# Patient Record
Sex: MALE | NOT HISPANIC OR LATINO
[De-identification: names, ages, dates, MRNs, and addresses within clinical notes are randomized per-mention and may not be internally consistent; named-entity substitution may affect disease eponyms.]

---

## 2019-10-02 ENCOUNTER — APPOINTMENT (OUTPATIENT)
Dept: DERMATOLOGY | Facility: CLINIC | Age: 84
End: 2019-10-02
Payer: MEDICARE

## 2019-10-02 ENCOUNTER — TRANSCRIPTION ENCOUNTER (OUTPATIENT)
Age: 84
End: 2019-10-02

## 2019-10-02 ENCOUNTER — RESULT REVIEW (OUTPATIENT)
Age: 84
End: 2019-10-02

## 2019-10-02 PROCEDURE — 99203 OFFICE O/P NEW LOW 30 MIN: CPT | Mod: 25

## 2019-10-02 PROCEDURE — 17000 DESTRUCT PREMALG LESION: CPT

## 2019-10-25 ENCOUNTER — APPOINTMENT (OUTPATIENT)
Dept: DERMATOLOGY | Facility: CLINIC | Age: 84
End: 2019-10-25
Payer: MEDICARE

## 2019-10-25 PROCEDURE — 99213 OFFICE O/P EST LOW 20 MIN: CPT | Mod: 25

## 2019-10-25 PROCEDURE — 17000 DESTRUCT PREMALG LESION: CPT

## 2021-02-22 PROBLEM — Z00.00 ENCOUNTER FOR PREVENTIVE HEALTH EXAMINATION: Status: ACTIVE | Noted: 2021-02-22

## 2021-03-03 ENCOUNTER — OUTPATIENT (OUTPATIENT)
Dept: OUTPATIENT SERVICES | Facility: HOSPITAL | Age: 86
LOS: 1 days | End: 2021-03-03

## 2021-03-03 ENCOUNTER — APPOINTMENT (OUTPATIENT)
Dept: NUCLEAR MEDICINE | Facility: CLINIC | Age: 86
End: 2021-03-03
Payer: MEDICARE

## 2021-03-03 DIAGNOSIS — C61 MALIGNANT NEOPLASM OF PROSTATE: ICD-10-CM

## 2021-03-03 PROCEDURE — 78815 PET IMAGE W/CT SKULL-THIGH: CPT | Mod: 26,PS

## 2021-03-22 ENCOUNTER — APPOINTMENT (OUTPATIENT)
Dept: DERMATOLOGY | Facility: CLINIC | Age: 86
End: 2021-03-22

## 2021-05-10 ENCOUNTER — APPOINTMENT (OUTPATIENT)
Dept: MRI IMAGING | Facility: CLINIC | Age: 86
End: 2021-05-10
Payer: MEDICARE

## 2021-05-10 ENCOUNTER — OUTPATIENT (OUTPATIENT)
Dept: OUTPATIENT SERVICES | Facility: HOSPITAL | Age: 86
LOS: 1 days | End: 2021-05-10
Payer: MEDICARE

## 2021-05-10 DIAGNOSIS — Z00.8 ENCOUNTER FOR OTHER GENERAL EXAMINATION: ICD-10-CM

## 2021-05-10 PROCEDURE — 72197 MRI PELVIS W/O & W/DYE: CPT

## 2021-05-10 PROCEDURE — 72197 MRI PELVIS W/O & W/DYE: CPT | Mod: 26,MH

## 2021-05-10 PROCEDURE — 76377 3D RENDER W/INTRP POSTPROCES: CPT

## 2021-05-10 PROCEDURE — A9585: CPT

## 2021-05-10 PROCEDURE — 76377 3D RENDER W/INTRP POSTPROCES: CPT | Mod: 26

## 2021-06-21 ENCOUNTER — APPOINTMENT (OUTPATIENT)
Dept: DERMATOLOGY | Facility: CLINIC | Age: 86
End: 2021-06-21
Payer: MEDICARE

## 2021-06-21 PROCEDURE — 99213 OFFICE O/P EST LOW 20 MIN: CPT | Mod: 25

## 2021-06-21 PROCEDURE — 17003 DESTRUCT PREMALG LES 2-14: CPT

## 2021-06-21 PROCEDURE — 17000 DESTRUCT PREMALG LESION: CPT

## 2021-06-21 PROCEDURE — 99072 ADDL SUPL MATRL&STAF TM PHE: CPT

## 2024-01-22 ENCOUNTER — INPATIENT (INPATIENT)
Facility: HOSPITAL | Age: 89
LOS: 2 days | Discharge: HOME | DRG: 640 | End: 2024-01-25
Attending: STUDENT IN AN ORGANIZED HEALTH CARE EDUCATION/TRAINING PROGRAM | Admitting: HOSPITALIST
Payer: MEDICARE

## 2024-01-22 VITALS
RESPIRATION RATE: 18 BRPM | OXYGEN SATURATION: 99 % | HEART RATE: 110 BPM | HEIGHT: 67 IN | DIASTOLIC BLOOD PRESSURE: 90 MMHG | TEMPERATURE: 98 F | SYSTOLIC BLOOD PRESSURE: 123 MMHG | WEIGHT: 145.06 LBS

## 2024-01-22 DIAGNOSIS — I63.9 CEREBRAL INFARCTION, UNSPECIFIED: ICD-10-CM

## 2024-01-22 LAB
ALBUMIN SERPL ELPH-MCNC: 3.9 G/DL — SIGNIFICANT CHANGE UP (ref 3.3–5.2)
ALP SERPL-CCNC: 76 U/L — SIGNIFICANT CHANGE UP (ref 40–120)
ALT FLD-CCNC: 11 U/L — SIGNIFICANT CHANGE UP
ANION GAP SERPL CALC-SCNC: 14 MMOL/L — SIGNIFICANT CHANGE UP (ref 5–17)
APPEARANCE UR: CLEAR — SIGNIFICANT CHANGE UP
APTT BLD: 32.8 SEC — SIGNIFICANT CHANGE UP (ref 24.5–35.6)
AST SERPL-CCNC: 14 U/L — SIGNIFICANT CHANGE UP
BACTERIA # UR AUTO: NEGATIVE /HPF — SIGNIFICANT CHANGE UP
BASOPHILS # BLD AUTO: 0.03 K/UL — SIGNIFICANT CHANGE UP (ref 0–0.2)
BASOPHILS NFR BLD AUTO: 0.2 % — SIGNIFICANT CHANGE UP (ref 0–2)
BILIRUB SERPL-MCNC: 0.8 MG/DL — SIGNIFICANT CHANGE UP (ref 0.4–2)
BILIRUB UR-MCNC: NEGATIVE — SIGNIFICANT CHANGE UP
BUN SERPL-MCNC: 45.1 MG/DL — HIGH (ref 8–20)
CALCIUM SERPL-MCNC: 10.3 MG/DL — SIGNIFICANT CHANGE UP (ref 8.4–10.5)
CAST: 1 /LPF — SIGNIFICANT CHANGE UP (ref 0–4)
CHLORIDE SERPL-SCNC: 109 MMOL/L — HIGH (ref 96–108)
CO2 SERPL-SCNC: 22 MMOL/L — SIGNIFICANT CHANGE UP (ref 22–29)
COLOR SPEC: YELLOW — SIGNIFICANT CHANGE UP
CREAT SERPL-MCNC: 0.45 MG/DL — LOW (ref 0.5–1.3)
DIFF PNL FLD: NEGATIVE — SIGNIFICANT CHANGE UP
EGFR: 99 ML/MIN/1.73M2 — SIGNIFICANT CHANGE UP
EOSINOPHIL # BLD AUTO: 0.01 K/UL — SIGNIFICANT CHANGE UP (ref 0–0.5)
EOSINOPHIL NFR BLD AUTO: 0.1 % — SIGNIFICANT CHANGE UP (ref 0–6)
GLUCOSE SERPL-MCNC: 144 MG/DL — HIGH (ref 70–99)
GLUCOSE UR QL: NEGATIVE MG/DL — SIGNIFICANT CHANGE UP
HCT VFR BLD CALC: 41.7 % — SIGNIFICANT CHANGE UP (ref 39–50)
HGB BLD-MCNC: 13.7 G/DL — SIGNIFICANT CHANGE UP (ref 13–17)
IMM GRANULOCYTES NFR BLD AUTO: 0.5 % — SIGNIFICANT CHANGE UP (ref 0–0.9)
INR BLD: 1.18 RATIO — SIGNIFICANT CHANGE UP (ref 0.85–1.18)
KETONES UR-MCNC: 15 MG/DL
LACTATE BLDV-MCNC: 2 MMOL/L — SIGNIFICANT CHANGE UP (ref 0.5–2)
LEUKOCYTE ESTERASE UR-ACNC: ABNORMAL
LYMPHOCYTES # BLD AUTO: 0.64 K/UL — LOW (ref 1–3.3)
LYMPHOCYTES # BLD AUTO: 3.9 % — LOW (ref 13–44)
MCHC RBC-ENTMCNC: 32.9 GM/DL — SIGNIFICANT CHANGE UP (ref 32–36)
MCHC RBC-ENTMCNC: 33.3 PG — SIGNIFICANT CHANGE UP (ref 27–34)
MCV RBC AUTO: 101.5 FL — HIGH (ref 80–100)
MONOCYTES # BLD AUTO: 0.63 K/UL — SIGNIFICANT CHANGE UP (ref 0–0.9)
MONOCYTES NFR BLD AUTO: 3.9 % — SIGNIFICANT CHANGE UP (ref 2–14)
NEUTROPHILS # BLD AUTO: 14.88 K/UL — HIGH (ref 1.8–7.4)
NEUTROPHILS NFR BLD AUTO: 91.4 % — HIGH (ref 43–77)
NITRITE UR-MCNC: NEGATIVE — SIGNIFICANT CHANGE UP
PH UR: 6 — SIGNIFICANT CHANGE UP (ref 5–8)
PLATELET # BLD AUTO: 393 K/UL — SIGNIFICANT CHANGE UP (ref 150–400)
POTASSIUM SERPL-MCNC: 3.5 MMOL/L — SIGNIFICANT CHANGE UP (ref 3.5–5.3)
POTASSIUM SERPL-SCNC: 3.5 MMOL/L — SIGNIFICANT CHANGE UP (ref 3.5–5.3)
PROT SERPL-MCNC: 7.7 G/DL — SIGNIFICANT CHANGE UP (ref 6.6–8.7)
PROT UR-MCNC: 30 MG/DL
PROTHROM AB SERPL-ACNC: 13 SEC — SIGNIFICANT CHANGE UP (ref 9.5–13)
RAPID RVP RESULT: SIGNIFICANT CHANGE UP
RBC # BLD: 4.11 M/UL — LOW (ref 4.2–5.8)
RBC # FLD: 12.7 % — SIGNIFICANT CHANGE UP (ref 10.3–14.5)
RBC CASTS # UR COMP ASSIST: 2 /HPF — SIGNIFICANT CHANGE UP (ref 0–4)
SARS-COV-2 RNA SPEC QL NAA+PROBE: SIGNIFICANT CHANGE UP
SODIUM SERPL-SCNC: 144 MMOL/L — SIGNIFICANT CHANGE UP (ref 135–145)
SP GR SPEC: 1.02 — SIGNIFICANT CHANGE UP (ref 1–1.03)
SQUAMOUS # UR AUTO: 2 /HPF — SIGNIFICANT CHANGE UP (ref 0–5)
TROPONIN T, HIGH SENSITIVITY RESULT: 18 NG/L — SIGNIFICANT CHANGE UP (ref 0–51)
UROBILINOGEN FLD QL: 1 MG/DL — SIGNIFICANT CHANGE UP (ref 0.2–1)
WBC # BLD: 16.27 K/UL — HIGH (ref 3.8–10.5)
WBC # FLD AUTO: 16.27 K/UL — HIGH (ref 3.8–10.5)
WBC UR QL: 2 /HPF — SIGNIFICANT CHANGE UP (ref 0–5)

## 2024-01-22 PROCEDURE — 93010 ELECTROCARDIOGRAM REPORT: CPT

## 2024-01-22 PROCEDURE — 99285 EMERGENCY DEPT VISIT HI MDM: CPT

## 2024-01-22 PROCEDURE — 99223 1ST HOSP IP/OBS HIGH 75: CPT

## 2024-01-22 PROCEDURE — 70450 CT HEAD/BRAIN W/O DYE: CPT | Mod: 26,MA

## 2024-01-22 PROCEDURE — 99497 ADVNCD CARE PLAN 30 MIN: CPT | Mod: 25

## 2024-01-22 PROCEDURE — 71045 X-RAY EXAM CHEST 1 VIEW: CPT | Mod: 26

## 2024-01-22 RX ORDER — ACETAMINOPHEN 500 MG
650 TABLET ORAL EVERY 6 HOURS
Refills: 0 | Status: DISCONTINUED | OUTPATIENT
Start: 2024-01-22 | End: 2024-01-25

## 2024-01-22 RX ORDER — CEFTRIAXONE 500 MG/1
1000 INJECTION, POWDER, FOR SOLUTION INTRAMUSCULAR; INTRAVENOUS ONCE
Refills: 0 | Status: COMPLETED | OUTPATIENT
Start: 2024-01-22 | End: 2024-01-22

## 2024-01-22 RX ORDER — ONDANSETRON 8 MG/1
4 TABLET, FILM COATED ORAL EVERY 8 HOURS
Refills: 0 | Status: DISCONTINUED | OUTPATIENT
Start: 2024-01-22 | End: 2024-01-25

## 2024-01-22 RX ORDER — SODIUM CHLORIDE 9 MG/ML
2000 INJECTION, SOLUTION INTRAVENOUS ONCE
Refills: 0 | Status: DISCONTINUED | OUTPATIENT
Start: 2024-01-22 | End: 2024-01-22

## 2024-01-22 RX ORDER — ENOXAPARIN SODIUM 100 MG/ML
40 INJECTION SUBCUTANEOUS EVERY 24 HOURS
Refills: 0 | Status: DISCONTINUED | OUTPATIENT
Start: 2024-01-22 | End: 2024-01-25

## 2024-01-22 RX ORDER — LANOLIN ALCOHOL/MO/W.PET/CERES
3 CREAM (GRAM) TOPICAL AT BEDTIME
Refills: 0 | Status: DISCONTINUED | OUTPATIENT
Start: 2024-01-22 | End: 2024-01-25

## 2024-01-22 RX ORDER — SODIUM CHLORIDE 9 MG/ML
2000 INJECTION INTRAMUSCULAR; INTRAVENOUS; SUBCUTANEOUS ONCE
Refills: 0 | Status: COMPLETED | OUTPATIENT
Start: 2024-01-22 | End: 2024-01-22

## 2024-01-22 RX ADMIN — SODIUM CHLORIDE 2000 MILLILITER(S): 9 INJECTION INTRAMUSCULAR; INTRAVENOUS; SUBCUTANEOUS at 21:15

## 2024-01-22 RX ADMIN — CEFTRIAXONE 1000 MILLIGRAM(S): 500 INJECTION, POWDER, FOR SOLUTION INTRAMUSCULAR; INTRAVENOUS at 22:45

## 2024-01-22 NOTE — ED ADULT NURSE NOTE - PATIENT IS UNABLE TO BE SCREENED DUE TO:
Telephone Encounter by Sharad Toledo at 02/09/17 10:33 AM     Author:  Sharad Toledo Service:  (none) Author Type:  (none)     Filed:  02/09/17 10:34 AM Encounter Date:  2/9/2017 Status:  Signed     :  Sharad Toledo            Left message on recorder to call back.[AO1.1T]     Please schedule complete with Dr. Matson for diabetic exam, per orders.[AO1.1M]       Revision History        User Key Date/Time User Provider Type Action    > AO1.1 02/09/17 10:34 AM Sharda Toledo (none) Sign    M - Manual, T - Template             Patient refused

## 2024-01-22 NOTE — H&P ADULT - CONVERSATION DETAILS
Discussed GOC with patient daughter Jing who states she is his HCP. She says they have had this discussion in the past, and her father does not want any extreme measures. In the event of cardiac arrest, he would not want CPR including chest compressions and intubation and would prefer to pass peacefully. They would want to trial antibiotics, IVF, and to send patient to the hospital if needed, but would not want to take any extreme measures. MOLST form completed indicating DNR/DNI with trial of NIPPV.

## 2024-01-22 NOTE — ED PROVIDER NOTE - NSICDXPASTMEDICALHX_GEN_ALL_CORE_FT
PAST MEDICAL HISTORY:  Chronic UTI     CVA (cerebrovascular accident)     Prostate CA     Right hemiplegia

## 2024-01-22 NOTE — ED PROVIDER NOTE - OBJECTIVE STATEMENT
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 Hx obtained from daughters at bedside. Patient is noted decrease oral intake for the past 1.5 weeks. and no oral intake over the past 24 hours. Patient also developed slurred speech yesteday afternoon which has progressed. 89 yo male with pmhx of htn, cva with complete right sided hemiplegia and dysphagia, prostate CA, and chronic UTI. Hx obtained from daughters at bedside. Patient is noted decrease oral intake for the past 1.5 weeks. and no oral intake over the past 24 hours. Patient also developed slurred speech yesterday afternoon which has progressed. Patient's daughters' state he has been on antibiotics for several weeks to treat UTI. The antibiotics was changed about two weeks ago. 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 Hx obtained from daughters at bedside. Patient is noted decrease oral intake for the past 1.5 weeks. and no oral intake over the past 24 hours. Patient also developed slurred speech yesterday afternoon which has progressed. Patient's daughters' state he has been on antibiotics for several weeks to treat UTI. The antibiotics was changed about two weeks ago; ciprofloxacin to macrobid.

## 2024-01-22 NOTE — ED ADULT TRIAGE NOTE - CHIEF COMPLAINT QUOTE
pt BIBA from home with AMS and slurred speech, LKW 2 days ago. pt with hx CVA in the past as well. pt confused, cannot follow commands.

## 2024-01-22 NOTE — H&P ADULT - ASSESSMENT
89 yo male with pmhx of HTN, CVA with residual right sided hemiplegia and dysphagia, Prostate CA, chronic UTI presenting to the ED with daughter for decrease PO intake and slurred speech.    Slurred Speech      SIRS+, no source  -Tachycardic 110 and leukocytosis of 16K  -UA neg, though patient has reportedly just been on Cipro then Macrobid outpatient for UTI  -CXR without infiltrate  -Cover with Ceftriaxone for now  -Follow up blood and urine cultures     HTN      Prostate Ca       DVTppx:  GOC: 89 yo male with pmhx of HTN, CVA with residual right sided hemiplegia and dysphagia, Prostate CA, chronic UTI presenting to the ED with daughter for decrease PO intake and slurred speech.    Slurred Speech, AMS  -Admit to medicine  -CTH negative for signs of acute infarct; chronic infarct as well as chronic changes/volume loss noted  -Patient recently started on Remeron, will hold for now in case it is contributing    SIRS+, no source  -Tachycardic 110 and leukocytosis of 16K  -UA neg, though patient has reportedly just been on Cipro then Macrobid outpatient for UTI  -CXR without infiltrate  -Cover with Ceftriaxone for now  -Follow up blood and urine cultures     HTN  -Continue Lisinopril 10 mg daily with hold parameters    Prostate Ca   -On Enzalumatide 140 mg daily  -Non-formulary, patient may bring home med    Overactive Bladder  -Continue Myrbetric ER 50 mg daily    DVTppx: Lovenox  GOC: DNR/DNI, MOLST completed in chart 89 yo male with pmhx of HTN, CVA with residual right sided hemiplegia and dysphagia, Prostate CA, chronic UTI presenting to the ED with daughter for decrease PO intake and slurred speech.    Slurred Speech, AMS  -Admit to medicine  -CTH negative for signs of acute infarct; chronic infarct as well as chronic changes/volume loss noted  -Check MRI to r/o stroke  -TTE   -Neurochecks Q4  -Patient recently started on Remeron, will hold for now in case it is contributing  -SLP eval; NPO pending eval  -PT/OT eval  -Check Vit B12, TSH, Ammonia, VBG  -NIH scale could not be performed as patient could not participate in exam; he is arousable but not following commands  -Neuro Stroke team consulted    SIRS+, no source  -Tachycardic 110 and leukocytosis of 16K  -UA neg, though patient has reportedly just been on Cipro then Macrobid outpatient for UTI  -CXR without infiltrate, no upper respiratory symptoms to report  -Cover with Ceftriaxone for now  -Follow up blood and urine cultures     HTN  -Continue Lisinopril 10 mg daily with hold parameters    Prostate Ca   -On Enzalumatide 140 mg daily  -Non-formulary, patient may bring home med    Overactive Bladder  -Continue Myrbetric ER 50 mg daily once patient can tolerate PO    DVTppx: Lovenox  GOC: DNR/DNI, MOLST completed in chart 89 yo male with pmhx of HTN, CVA with residual right sided hemiplegia and dysphagia, Prostate CA, chronic UTI presenting to the ED with daughter for decrease PO intake and slurred speech.    Slurred Speech, AMS  -Admit to medicine  -CTH negative for signs of acute infarct; chronic infarct as well as chronic changes/volume loss noted  -Check MRI to r/o stroke  -TTE   -Rectal aspirin, start statin when patient can tolerate PO  -Neurochecks Q4  -Patient recently started on Remeron, will hold for now in case it is contributing  -SLP eval; NPO pending eval  -PT/OT eval  -Check Vit B12, TSH, Ammonia, VBG  -NIH scale could not be performed as patient could not participate in exam; he is arousable but not following commands  -Neuro Stroke team consulted    SIRS+, no source  -Tachycardic 110 and leukocytosis of 16K  -UA neg, though patient has reportedly just been on Cipro then Macrobid outpatient for UTI  -CXR without infiltrate, no upper respiratory symptoms to report  -Cover with Ceftriaxone for now  -Follow up blood and urine cultures     HTN  -Continue Lisinopril 10 mg daily with hold parameters    Prostate Ca   -On Enzalumatide 140 mg daily  -Non-formulary, patient may bring home med    Overactive Bladder  -Continue Myrbetric ER 50 mg daily once patient can tolerate PO    DVTppx: Lovenox  GOC: DNR/DNI, MOLST completed in chart

## 2024-01-22 NOTE — ED ADULT NURSE NOTE - ED STAT RN HANDOFF DETAILS
Report given to YANIQUE Lagunas in CDU. Pt mental status at baseline, VSS, pt on CM. No distress noted, no complaints at this time.

## 2024-01-22 NOTE — H&P ADULT - DECISION MAKER
Jermanie Draper is a 67 year old male who presents to the office for follow up on HTN, hyperlipidemia, renal failure, and elevated blood sugar    The patient has been checking their blood pressure since the last office visit and has been getting readings of 130's - 140's/80's.   He is off all bp meds.  He has seen sbp in 200's once.     The patient has been working on their cholesterol diet. He feels it is doing well.     He is scheduled for TURP.  He has BPH with urinary obstruction which has caused elevated creatinine.      He has been working on his sugar diet. He feels it is doing well.     ROS:  The patient denies any fever/chills, n/v/d, lightheadness/dizziness, tinnitus, sore throat, cough, sob, wheezing, chest pain, abd pain, or rash.      Nurses notes reviewed and agreed with.    Past Medical History   Diagnosis Date   • Anxiety    • Arthritis      Hands   • COPD (chronic obstructive pulmonary disease)    • Depression    • Epistaxis    • Esophageal reflux    • Essential hypertension, benign    • Generalized anxiety disorder 6/24/2010   • Hiatal Hernia    • Hydronephrosis 10/18/2016   • Hypertrophy of prostate with urinary obstruction and other lower urinary tract symptoms (LUTS)    • Internal hemorrhoids    • Mixed hyperlipidemia    • Pneumonia    • PTSD (post-traumatic stress disorder)      War    • Sciatica    • Self-catheterizes urinary bladder      4x/day   • Sinusitis, chronic    • Wears glasses        Past Surgical History   Procedure Laterality Date   • Head surgery proc unlisted       Skull/Facial/TMJ   • Mri brain combo  4/15/05     Normal mri brain/Lg 3.3 cm mass rt.nasal cavity prob.benign.   • Ct sinus no contrast limt  6/29/05   • Mri lumbar spine combo  2/23/06     Findings are consistent with mild central stenosis and seveve rt.neural foramina stenosis.   • Colonoscopy w biopsy  09/20/2006     internal hemorrhids, granular mucosa in rectum. BX:suggestive of possible solitary ulcer  syndrome/mucosal prolapse syndrome. Focal hyperplastic change   • Esophagogastroduodenoscopy transoral flex w/bx single or mult  09/20/2006     Kristi Morton Grade I reflux esophagitis. Hiatal hernia. Gastritis. Erythematous duodenopathy. BX:Gastric mucosa shows reactive foveolar hyperplasia, fibrosis and chronic inflammation consistent with tissue adjacent to healed or healing ulcer. No H Pylori. Focal intestinal metaplasia nted. Red cell extravasation in benign squamous mucosa   • Knee surgery       bilateral   • Tear duct surgery     • Colonoscopy w biopsy  6/1/2012     Fam Hx CC, 2 tubular adenomas, 5 yr recall   • Eye surgery Left 05/03/2007     cataract   • Eye surgery Right 05/24/2007     cataract       ALLERGIES:  No Known Allergies    Current Outpatient Prescriptions   Medication Sig Dispense Refill   • magnesium lactate (MAG-TAB SR) 84 MG (7MEQ) Tab CR Take 1 tablet by mouth daily (with breakfast). 30 tablet 3   • ferrous sulfate 325 (65 FE) MG tablet Take 1 tablet by mouth daily (with breakfast). 30 tablet 3   • tamsulosin (FLOMAX) 0.4 MG Cap Take 1 capsule by mouth nightly. 90 capsule 3   • cholecalciferol (VITAMIN D3) 1000 UNITS tablet Take 1 tablet by mouth daily. 30 tablet 1   • buPROPion (WELLBUTRIN XL) 150 MG 24 hr tablet Take 1 tablet by mouth daily. 90 tablet 3   • lovastatin (MEVACOR) 40 MG tablet Take 2 tablets by mouth nightly. 180 tablet 3   • albuterol (PROAIR HFA) 108 (90 BASE) MCG/ACT inhaler Inhale 2 puffs into the lungs every 4 hours as needed for Shortness of Breath or Wheezing. 3 Inhaler 3   • Garlic 1000 MG CAPS Take 2 capsules by mouth daily. 60 capsule 0   • omeprazole 20 MG tablet Take 1 tablet by mouth daily.     • Multiple Vitamin (MULTIVITAMIN) capsule Take 1 capsule by mouth daily.     • Cinnamon 500 MG CAPS Take 2,000 mg by mouth daily.     • Omega-3 Fatty Acids (FISH OIL) 1200 MG capsule Take 1,200 mg by mouth daily.     • Turmeric 500 MG CAPS Take 500 mg by mouth daily.     •  amoxicillin (AMOXIL) 500 MG capsule Take 1 capsule by mouth 2 times daily for 10 days. 20 capsule 0   • amLODIPine (NORVASC) 5 MG tablet Take 1 tablet by mouth daily. 30 tablet 1     No current facility-administered medications for this visit.        Family History   Problem Relation Age of Onset   • Hypertension Mother    • Diabetes Mother    • Cancer Father      colon (60's)   • Diabetes Sister      1   • Diabetes Sister      2       Social History   Substance Use Topics   • Smoking status: Former Smoker     Packs/day: 2.50     Years: 40.00     Types: Cigarettes     Quit date: 4/14/2014   • Smokeless tobacco: Former User   • Alcohol use 0.6 oz/week     1 Standard drinks or equivalent per week     PE:  GEN - NAD, A&OX3, generally healthy appearance  HEART - RRR without M/R/C/G, PMI nondisplaced.  LUNGS - CTA B, good AE, no wz, no retractions.  ALL EXT - FROM, muscle strength 5/5 all ext and symmetric;       2/4 distal pulses all ext, no edema, good cap refill,       intact sensation, negative homans.  SKIN - no rashes/lesions    A -  1) HTN   2) Hyperlipidemia   3) Renal failure   4) BPH   5) Elevated blood sugar    P -  1) HTN - uncontrolled - Reviewed the lab results with the patient.  discussed treatment options. Discussed potential side effects of the medications/procedures with the patient. Reviewed dietary and exercise modifications with the patient.  They wish to work on diet and exercise and add Norvasc     2) Hyperlipidemia - uncontrolled - Reviewed the lab results with the patient.  discussed treatment options. Discussed potential side effects of the medications/procedures with the patient. Reviewed dietary and exercise modifications with the patient.  They wish to work on diet and exercise.      3) Renal failure - ? Secondary to BPH - will await results of surgery     4) BPH - uncontrolled - he is scheduled for surgery on 1/27/17     5) Elevated blood sugar - uncontrolled - Reviewed the lab results with  the patient.  discussed treatment options. Discussed potential side effects of the medications/procedures with the patient. Reviewed dietary and exercise modifications with the patient.  They wish to work on diet and exercise.     Patient to follow up for routine health care and prn.    Health Care Proxy

## 2024-01-22 NOTE — H&P ADULT - HISTORY OF PRESENT ILLNESS
89 yo male with pmhx of HTN, CVA with residual right sided hemiplegia and dysphagia, Prostate CA, chronic UTI presenting to the ED with daughter for decrease PO intake and slurred speech. 89 yo male with pmhx of HTN, CVA with residual right sided hemiplegia and dysphagia, Prostate CA, chronic UTI presenting to the ED with daughter for decrease PO intake and slurred speech. Called daughter Jing Vazquez [phone] (HCP) to obtain collateral info as patient currently cannot give history. Daughter states patient had a hemorrhagic stroke 1.5 years ago when residing in [place]. From this stroke, he has residual right sided weakness and dysphagia. He still can tolerate soft foods. Since then, he has moved to [place] to live with her and up until recently has been doing well. About 1.5 weeks ago, she noted him to have decreased appetite and poor PO intake. She then noted him to have slurred speech and confusion over the past day which has progressed. Daughter states patient is normally AAOx4, clear and very sharp. Patient did recently complete 2 courses of antibiotics outpatient for UTI (Cipro and Macrobid). Daughter denies fever at home, no cough, shortness of breath, abdominal pain reported.

## 2024-01-22 NOTE — ED ADULT NURSE NOTE - OBJECTIVE STATEMENT
Bibems, patient has pmhx htn, right sided hemiplegia, patient doesn't answer questions, refuses neuro checks. Denies chest pain. Able to move left arm. Patient awakens to verbal stimuli and responds to name.

## 2024-01-22 NOTE — ED PROVIDER NOTE - ENMT, MLM
Airway patent, Nasal mucosa clear. Mouth with tacky mucosa. Throat has no vesicles, no oropharyngeal exudates and uvula is midline.

## 2024-01-22 NOTE — ED ADULT NURSE REASSESSMENT NOTE - NS ED NURSE REASSESS COMMENT FT1
Gave report to YANIQUE Carlos, patient transported to critical 5 , patient is awake, calm, RR even and unlabored, sinus tachy on cm, iv patent, ct performed, bed locked in lowest position, safety maintained.
patient transported to ct, refusing neuro check, awakens to verbal stimuli, iv patent, blood work sent to lab, fs performed, sinus tachy on cm. RR even and unlabored.
Assumed care of pt at this time. Pt arrived from CT. Pt A&Ox2 oriented to person and place, awake and responsive to verbal stimuli. Pt arrives c/o slurred speech and AMS since yesterday. Family endorses pt is "not himself. Normally he is alert and able to communicate." Hx of stroke. At baseline pt has right sided paralysis and decreased sensation. Airway patent. Respirations even and unlabored. Bed in lowest position with side rails up. Pt on cardiac monitor NSR. Family at bedside.

## 2024-01-22 NOTE — ED PROVIDER NOTE - CLINICAL SUMMARY MEDICAL DECISION MAKING FREE TEXT BOX
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 Hx obtained from daughters at bedside. Patient is noted decrease oral intake for the past 1.5 weeks. and no oral intake over the past 24 hours. Patient also developed slurred speech yesterday afternoon which has progressed. Patient's daughters' state he has been on antibiotics for several weeks to treat UTI. The antibiotics was changed about two weeks ago. Will assess for cause of patient's change in condition; labs, CT, UA, and re-assess.

## 2024-01-22 NOTE — ED ADULT NURSE NOTE - NSFALLHARMRISKINTERV_ED_ALL_ED

## 2024-01-22 NOTE — H&P ADULT - NSHPLABSRESULTS_GEN_ALL_CORE
13.7   16.27 )-----------( 393      ( 2024 19:35 )             41.7     2024 19:35    144    |  109    |  45.1   ----------------------------<  144    3.5     |  22.0   |  0.45     Ca    10.3       2024 19:35    TPro  7.7    /  Alb  3.9    /  TBili  0.8    /  DBili  x      /  AST  14     /  ALT  11     /  AlkPhos  76     2024 19:35    PT/INR - ( 2024 19:35 )   PT: 13.0 sec;   INR: 1.18 ratio         PTT - ( 2024 19:35 )  PTT:32.8 sec  CAPILLARY BLOOD GLUCOSE      POCT Blood Glucose.: 139 mg/dL (2024 20:37)  POCT Blood Glucose.: 133 mg/dL (2024 19:50)    LIVER FUNCTIONS - ( 2024 19:35 )  Alb: 3.9 g/dL / Pro: 7.7 g/dL / ALK PHOS: 76 U/L / ALT: 11 U/L / AST: 14 U/L / GGT: x           Urinalysis Basic - ( 2024 21:20 )    Color: Yellow / Appearance: Clear / S.025 / pH: x  Gluc: x / Ketone: 15 mg/dL  / Bili: Negative / Urobili: 1.0 mg/dL   Blood: x / Protein: 30 mg/dL / Nitrite: Negative   Leuk Esterase: Trace / RBC: 2 /HPF / WBC 2 /HPF   Sq Epi: x / Non Sq Epi: 2 /HPF / Bacteria: Negative /HPF      < from: CT Head No Cont (24 @ 20:47) >    IMPRESSION:  No acute intracranial abnormality. Volume loss and chronic microvascular   ischemic disease as above. Old lacunar infarcts as above. Very mild sinus   disease.    --- End of Report ---    < end of copied text >

## 2024-01-22 NOTE — H&P ADULT - NSHPLANGLIMITEDENGLISH_GEN_A_CORE
Spoke with patient xiaoadrian  Deon Lopez explained he hasn't seen much change since starting the Tyvaso DPI- He is up to his target dose 64 4 times a day-
No

## 2024-01-23 DIAGNOSIS — Z98.890 OTHER SPECIFIED POSTPROCEDURAL STATES: Chronic | ICD-10-CM

## 2024-01-23 LAB
A1C WITH ESTIMATED AVERAGE GLUCOSE RESULT: 5 % — SIGNIFICANT CHANGE UP (ref 4–5.6)
AMMONIA BLD-MCNC: 26 UMOL/L — SIGNIFICANT CHANGE UP (ref 11–55)
ANION GAP SERPL CALC-SCNC: 13 MMOL/L — SIGNIFICANT CHANGE UP (ref 5–17)
BASE EXCESS BLDV CALC-SCNC: -1.8 MMOL/L — SIGNIFICANT CHANGE UP (ref -2–3)
BASOPHILS # BLD AUTO: 0.02 K/UL — SIGNIFICANT CHANGE UP (ref 0–0.2)
BASOPHILS NFR BLD AUTO: 0.3 % — SIGNIFICANT CHANGE UP (ref 0–2)
BUN SERPL-MCNC: 36 MG/DL — HIGH (ref 8–20)
CA-I SERPL-SCNC: 1.31 MMOL/L — SIGNIFICANT CHANGE UP (ref 1.15–1.33)
CALCIUM SERPL-MCNC: 9.5 MG/DL — SIGNIFICANT CHANGE UP (ref 8.4–10.5)
CHLORIDE BLDV-SCNC: 117 MMOL/L — HIGH (ref 96–108)
CHLORIDE SERPL-SCNC: 114 MMOL/L — HIGH (ref 96–108)
CHOLEST SERPL-MCNC: 175 MG/DL — SIGNIFICANT CHANGE UP
CO2 SERPL-SCNC: 19 MMOL/L — LOW (ref 22–29)
CREAT SERPL-MCNC: 0.41 MG/DL — LOW (ref 0.5–1.3)
EGFR: 102 ML/MIN/1.73M2 — SIGNIFICANT CHANGE UP
EOSINOPHIL # BLD AUTO: 0.04 K/UL — SIGNIFICANT CHANGE UP (ref 0–0.5)
EOSINOPHIL NFR BLD AUTO: 0.6 % — SIGNIFICANT CHANGE UP (ref 0–6)
ESTIMATED AVERAGE GLUCOSE: 97 MG/DL — SIGNIFICANT CHANGE UP (ref 68–114)
GAS PNL BLDV: 146 MMOL/L — HIGH (ref 136–145)
GAS PNL BLDV: SIGNIFICANT CHANGE UP
GLUCOSE BLDV-MCNC: 109 MG/DL — HIGH (ref 70–99)
GLUCOSE SERPL-MCNC: 109 MG/DL — HIGH (ref 70–99)
HCO3 BLDV-SCNC: 23 MMOL/L — SIGNIFICANT CHANGE UP (ref 22–29)
HCT VFR BLD CALC: 37.8 % — LOW (ref 39–50)
HCT VFR BLDA CALC: 38 % — SIGNIFICANT CHANGE UP
HDLC SERPL-MCNC: 38 MG/DL — LOW
HGB BLD CALC-MCNC: 12.6 G/DL — SIGNIFICANT CHANGE UP (ref 12.6–17.4)
HGB BLD-MCNC: 12.2 G/DL — LOW (ref 13–17)
IMM GRANULOCYTES NFR BLD AUTO: 0.7 % — SIGNIFICANT CHANGE UP (ref 0–0.9)
LACTATE BLDV-MCNC: 1 MMOL/L — SIGNIFICANT CHANGE UP (ref 0.5–2)
LIPID PNL WITH DIRECT LDL SERPL: 125 MG/DL — HIGH
LYMPHOCYTES # BLD AUTO: 1.07 K/UL — SIGNIFICANT CHANGE UP (ref 1–3.3)
LYMPHOCYTES # BLD AUTO: 15.1 % — SIGNIFICANT CHANGE UP (ref 13–44)
MCHC RBC-ENTMCNC: 32.3 GM/DL — SIGNIFICANT CHANGE UP (ref 32–36)
MCHC RBC-ENTMCNC: 33.9 PG — SIGNIFICANT CHANGE UP (ref 27–34)
MCV RBC AUTO: 105 FL — HIGH (ref 80–100)
MONOCYTES # BLD AUTO: 0.47 K/UL — SIGNIFICANT CHANGE UP (ref 0–0.9)
MONOCYTES NFR BLD AUTO: 6.6 % — SIGNIFICANT CHANGE UP (ref 2–14)
NEUTROPHILS # BLD AUTO: 5.42 K/UL — SIGNIFICANT CHANGE UP (ref 1.8–7.4)
NEUTROPHILS NFR BLD AUTO: 76.7 % — SIGNIFICANT CHANGE UP (ref 43–77)
NON HDL CHOLESTEROL: 137 MG/DL — HIGH
PCO2 BLDV: 36 MMHG — LOW (ref 42–55)
PH BLDV: 7.41 — SIGNIFICANT CHANGE UP (ref 7.32–7.43)
PLATELET # BLD AUTO: 316 K/UL — SIGNIFICANT CHANGE UP (ref 150–400)
PO2 BLDV: 69 MMHG — HIGH (ref 25–45)
POTASSIUM BLDV-SCNC: 3.4 MMOL/L — LOW (ref 3.5–5.1)
POTASSIUM SERPL-MCNC: 3.3 MMOL/L — LOW (ref 3.5–5.3)
POTASSIUM SERPL-SCNC: 3.3 MMOL/L — LOW (ref 3.5–5.3)
RBC # BLD: 3.6 M/UL — LOW (ref 4.2–5.8)
RBC # FLD: 12.9 % — SIGNIFICANT CHANGE UP (ref 10.3–14.5)
SAO2 % BLDV: 94.7 % — SIGNIFICANT CHANGE UP
SODIUM SERPL-SCNC: 146 MMOL/L — HIGH (ref 135–145)
TRIGL SERPL-MCNC: 62 MG/DL — SIGNIFICANT CHANGE UP
TSH SERPL-MCNC: 1.38 UIU/ML — SIGNIFICANT CHANGE UP (ref 0.27–4.2)
VIT B12 SERPL-MCNC: >2000 PG/ML — HIGH (ref 232–1245)
WBC # BLD: 7.07 K/UL — SIGNIFICANT CHANGE UP (ref 3.8–10.5)
WBC # FLD AUTO: 7.07 K/UL — SIGNIFICANT CHANGE UP (ref 3.8–10.5)

## 2024-01-23 PROCEDURE — 99223 1ST HOSP IP/OBS HIGH 75: CPT

## 2024-01-23 PROCEDURE — 93306 TTE W/DOPPLER COMPLETE: CPT | Mod: 26

## 2024-01-23 PROCEDURE — 99233 SBSQ HOSP IP/OBS HIGH 50: CPT

## 2024-01-23 RX ORDER — ENZALUTAMIDE 80 MG/1
160 TABLET ORAL DAILY
Refills: 0 | Status: DISCONTINUED | OUTPATIENT
Start: 2024-01-23 | End: 2024-01-25

## 2024-01-23 RX ORDER — PANTOPRAZOLE SODIUM 20 MG/1
40 TABLET, DELAYED RELEASE ORAL
Refills: 0 | Status: DISCONTINUED | OUTPATIENT
Start: 2024-01-23 | End: 2024-01-23

## 2024-01-23 RX ORDER — ASPIRIN/CALCIUM CARB/MAGNESIUM 324 MG
300 TABLET ORAL DAILY
Refills: 0 | Status: DISCONTINUED | OUTPATIENT
Start: 2024-01-23 | End: 2024-01-25

## 2024-01-23 RX ORDER — MIRABEGRON 50 MG/1
50 TABLET, EXTENDED RELEASE ORAL DAILY
Refills: 0 | Status: DISCONTINUED | OUTPATIENT
Start: 2024-01-23 | End: 2024-01-25

## 2024-01-23 RX ORDER — MIRTAZAPINE 45 MG/1
7.5 TABLET, ORALLY DISINTEGRATING ORAL DAILY
Refills: 0 | Status: DISCONTINUED | OUTPATIENT
Start: 2024-01-23 | End: 2024-01-25

## 2024-01-23 RX ORDER — PANTOPRAZOLE SODIUM 20 MG/1
40 TABLET, DELAYED RELEASE ORAL ONCE
Refills: 0 | Status: COMPLETED | OUTPATIENT
Start: 2024-01-23 | End: 2024-01-23

## 2024-01-23 RX ORDER — SODIUM CHLORIDE 9 MG/ML
1000 INJECTION, SOLUTION INTRAVENOUS
Refills: 0 | Status: DISCONTINUED | OUTPATIENT
Start: 2024-01-23 | End: 2024-01-24

## 2024-01-23 RX ORDER — POTASSIUM CHLORIDE 20 MEQ
10 PACKET (EA) ORAL
Refills: 0 | Status: COMPLETED | OUTPATIENT
Start: 2024-01-23 | End: 2024-01-23

## 2024-01-23 RX ORDER — LISINOPRIL 2.5 MG/1
10 TABLET ORAL DAILY
Refills: 0 | Status: DISCONTINUED | OUTPATIENT
Start: 2024-01-23 | End: 2024-01-25

## 2024-01-23 RX ADMIN — PANTOPRAZOLE SODIUM 40 MILLIGRAM(S): 20 TABLET, DELAYED RELEASE ORAL at 10:00

## 2024-01-23 RX ADMIN — Medication 100 MILLIEQUIVALENT(S): at 14:13

## 2024-01-23 RX ADMIN — Medication 300 MILLIGRAM(S): at 12:12

## 2024-01-23 RX ADMIN — MIRTAZAPINE 7.5 MILLIGRAM(S): 45 TABLET, ORALLY DISINTEGRATING ORAL at 14:11

## 2024-01-23 RX ADMIN — Medication 100 MILLIEQUIVALENT(S): at 09:29

## 2024-01-23 RX ADMIN — SODIUM CHLORIDE 60 MILLILITER(S): 9 INJECTION, SOLUTION INTRAVENOUS at 10:55

## 2024-01-23 RX ADMIN — MIRABEGRON 50 MILLIGRAM(S): 50 TABLET, EXTENDED RELEASE ORAL at 12:11

## 2024-01-23 RX ADMIN — Medication 100 MILLIEQUIVALENT(S): at 13:09

## 2024-01-23 NOTE — CONSULT NOTE ADULT - ASSESSMENT
The patient is a 89y Male with prior stroke and residual right hemiparesis.   Now with failure to thrive and increased slurred speech.    Slurred speech.   Likely secondary to dehydration.   Recommend gentle hydration.  Presentation does not suggest new stroke as this has been a subacute progression.  No objection to MRI to rule this out, however.  Mobilize with physical therapy.   Agree with plan for speech therapy for swallowing and speech.    Case discussed with ER team Dr Bates attending, and with Dr Christian.

## 2024-01-23 NOTE — OCCUPATIONAL THERAPY INITIAL EVALUATION ADULT - ADDITIONAL COMMENTS
Pt is a poor historian - unable to obtain social history; will benefit from care manager follow up. As per PT note, care manager reported pt is bedbound at baseline.

## 2024-01-23 NOTE — OCCUPATIONAL THERAPY INITIAL EVALUATION ADULT - RANGE OF MOTION EXAMINATION, UPPER EXTREMITY
right UE  with residual hemiparesis- spasticity at right bicep noted./Left UE Active ROM was WFL (within functional limits)

## 2024-01-23 NOTE — PHYSICAL THERAPY INITIAL EVALUATION ADULT - MANUAL MUSCLE TESTING RESULTS, REHAB EVAL
see OT eval for UES. left LE WNL, right LE limited by hx of CVA. left LE 3/5 grossly, right LE grossly 2/5. Pt. with difficulty following commands for testing

## 2024-01-23 NOTE — SWALLOW BEDSIDE ASSESSMENT ADULT - ORAL PHASE
Delayed oral transit time premature loss suspected/Decreased anterior-posterior movement of the bolus/Delayed oral transit time premature loss suspected/Delayed oral transit time Pt self removed, stating: "I don't want solid food!"

## 2024-01-23 NOTE — SWALLOW BEDSIDE ASSESSMENT ADULT - COMMENTS
As per MD note: "89 yo male with pmhx of HTN, CVA with residual right sided hemiplegia and dysphagia, Prostate CA, chronic UTI presenting to the ED with daughter for decrease PO intake and slurred speech."  1/22: CT head: negative for CVA, MRI pending

## 2024-01-23 NOTE — PHYSICAL THERAPY INITIAL EVALUATION ADULT - ADDITIONAL COMMENTS
Pt. a poor historian. Unable to report any social or functional hx. After PT eval - PT spoke with CCC/SW who reports pt. was bedbound but not confirmed yet where pt. presents from.

## 2024-01-23 NOTE — PHYSICAL THERAPY INITIAL EVALUATION ADULT - PERTINENT HX OF CURRENT PROBLEM, REHAB EVAL
89 yo male with pmhx of HTN, CVA with residual right sided hemiplegia and dysphagia, Prostate CA, chronic UTI presenting to the ED with daughter for decrease PO intake and slurred speech; acute metabolic encephalopathy

## 2024-01-23 NOTE — OCCUPATIONAL THERAPY INITIAL EVALUATION ADULT - PERTINENT HX OF CURRENT PROBLEM, REHAB EVAL
As per MD note: The patient is a 89y Male with prior history of stroke with residual right hemiparesis, and dysphagia now presents with decreased PO intake for the past few weeks. He developed slurring of the speech and ultimately was brought to the ER.  He has been treated for urinary tract infections over the past few weeks as well.

## 2024-01-23 NOTE — CONSULT NOTE ADULT - SUBJECTIVE AND OBJECTIVE BOX
[facility]                                        Neurology at [place]                                  Trevor Alarcon & David                                      [address]                                           [place], [place], [address]                                                [phone]        CC: slurred speech.    HISTORY:  The patient is a 89y Male with prior history of stroke with residual right hemiparesis, and dysphagia now presents with decreased PO intake for the past few weeks.   He developed slurring of the speech and ultimately was brought to the ER.   He has been treated for urinary tract infections over the past few weeks as well.     PAST MEDICAL & SURGICAL HISTORY:  CVA (cerebrovascular accident)  Prostate CA  Right hemiplegia  Chronic UTI  S/P shoulder surgery    MEDICATIONS  (STANDING):  aspirin Suppository 300 milliGRAM(s) Rectal daily  dextrose 5% + lactated ringers. 1000 milliLiter(s) (60 mL/Hr) IV Continuous <Continuous>  enoxaparin Injectable 40 milliGRAM(s) SubCutaneous every 24 hours  enzalutamide 160 milliGRAM(s) Oral daily  lisinopril 10 milliGRAM(s) Oral daily  mirabegron ER 50 milliGRAM(s) Oral daily  pantoprazole  Injectable 40 milliGRAM(s) IV Push once  potassium chloride  10 mEq/100 mL IVPB 10 milliEquivalent(s) IV Intermittent every 1 hour    MEDICATIONS  (PRN):  acetaminophen     Tablet .. 650 milliGRAM(s) Oral every 6 hours PRN Temp greater or equal to 38C (100.4F), Mild Pain (1 - 3)  aluminum hydroxide/magnesium hydroxide/simethicone Suspension 30 milliLiter(s) Oral every 4 hours PRN Dyspepsia  melatonin 3 milliGRAM(s) Oral at bedtime PRN Insomnia  ondansetron Injectable 4 milliGRAM(s) IV Push every 8 hours PRN Nausea and/or Vomiting    Allergies  No Known Allergies    SOCIAL HISTORY:  Non smoker.     FAMILY HISTORY:  FH: prostate cancer (Sibling)    ROS:  Constitutional: The patient denies fevers or weight changes.  Neuro: As per HPI.  Eyes: Denies blurry vision.  Ears/nose/throat: Denies Tinnitus.   Cardiac: Denies chest pain. Denies palpitations.  Respiratory: Denies shortness of breath.  GI: Denies abdominal pain, nausea, or vomiting.  : Denies change in urinary pattern.  Integumentary: Denies rash.  Psych: Denies recent mood changes.  Heme: denies easy bleeding/bruising.    Exam:  Vital Signs Last 24 Hrs  T(C): 36.3 (23 Jan 2024 04:43), Max: 37.3 (22 Jan 2024 21:30)  T(F): 97.4 (23 Jan 2024 04:43), Max: 99.1 (22 Jan 2024 21:30)  HR: 71 (23 Jan 2024 09:27) (64 - 110)  BP: 134/82 (23 Jan 2024 09:27) (106/67 - 135/67)  RR: 18 (23 Jan 2024 09:27) (16 - 20)  SpO2: 97% (23 Jan 2024 09:27) (95% - 99%)    Parameters below as of 23 Jan 2024 04:43  Patient On (Oxygen Delivery Method): room air    General: NAD.   Mucous membranes dry.  Carotid bruits absent.     Mental status: The patient is awake, alert, and fully oriented. There is marked dysarthria. He answers simple questions and follows only simple instructions with left side.     Cranial nerves: There is no papilledema. Pupils react symmetrically to light. There is no visual field deficit to confrontation. Extraocular motion is full with no nystagmus.  Facial sensation is intact. Facial musculature is symmetric. Palate elevates symmetrically. Tongue is midline.    Motor: There is increased tone in the right arm.  Strength is 0/5 in the right arm and leg.   Strength is 5/5 in the left arm and 4/5 in the left leg.    Sensation: Intact to light touch and pin.     Reflexes: 2+ throughout and plantar responses are flexor on the left and extensor on the right.    Cerebellar: There is no dysmetria on finger to nose testing on the left.    LABS:                         12.2   7.07  )-----------( 316      ( 23 Jan 2024 08:17 )             37.8       01-23    146<H>  |  114<H>  |  36.0<H>  ----------------------------<  109<H>  3.3<L>   |  19.0<L>  |  0.41<L>    Ca    9.5      23 Jan 2024 08:17    TPro  7.7  /  Alb  3.9  /  TBili  0.8  /  DBili  x   /  AST  14  /  ALT  11  /  AlkPhos  76  01-22    PT/INR - ( 22 Jan 2024 19:35 )   PT: 13.0 sec;   INR: 1.18 ratio    PTT - ( 22 Jan 2024 19:35 )  PTT:32.8 sec    RADIOLOGY   CT head images reviewed (and concur with report): There is no acute pathology.   There is chronic lacunar infarct in the left basal ganglia, and left frontal region. There is severe chronic ischemic change.

## 2024-01-23 NOTE — SWALLOW BEDSIDE ASSESSMENT ADULT - SWALLOW EVAL: DIAGNOSIS
+spit out solids, cough with tspn thin, cup of mild, reduced cognition & speech intelligibility Mild to moderate oral dysphagia across administered PO trials, further impacted by reduced cognition. Pharyngeal dysphagia supsected for intake of thin fluids via tspn & mildly thick fluids via cup with +overt s/s aspiration immediately post swallow. Pharyngeal stage of swallow clinically unremarkable for puree & moderately thick fluids

## 2024-01-23 NOTE — SWALLOW BEDSIDE ASSESSMENT ADULT - SLP GENERAL OBSERVATIONS
Pt received & seen seated upright via stretcher in ED, awake/alert, confused, reduced speech intelligibility, 0/10 pain pre/post

## 2024-01-24 LAB
ANION GAP SERPL CALC-SCNC: 11 MMOL/L — SIGNIFICANT CHANGE UP (ref 5–17)
ANION GAP SERPL CALC-SCNC: 11 MMOL/L — SIGNIFICANT CHANGE UP (ref 5–17)
ANION GAP SERPL CALC-SCNC: 12 MMOL/L — SIGNIFICANT CHANGE UP (ref 5–17)
BUN SERPL-MCNC: 23.9 MG/DL — HIGH (ref 8–20)
BUN SERPL-MCNC: 26.1 MG/DL — HIGH (ref 8–20)
BUN SERPL-MCNC: 29.9 MG/DL — HIGH (ref 8–20)
CALCIUM SERPL-MCNC: 9.2 MG/DL — SIGNIFICANT CHANGE UP (ref 8.4–10.5)
CALCIUM SERPL-MCNC: 9.3 MG/DL — SIGNIFICANT CHANGE UP (ref 8.4–10.5)
CALCIUM SERPL-MCNC: 9.3 MG/DL — SIGNIFICANT CHANGE UP (ref 8.4–10.5)
CHLORIDE SERPL-SCNC: 115 MMOL/L — HIGH (ref 96–108)
CHLORIDE SERPL-SCNC: 116 MMOL/L — HIGH (ref 96–108)
CHLORIDE SERPL-SCNC: 118 MMOL/L — HIGH (ref 96–108)
CO2 SERPL-SCNC: 20 MMOL/L — LOW (ref 22–29)
CO2 SERPL-SCNC: 21 MMOL/L — LOW (ref 22–29)
CO2 SERPL-SCNC: 21 MMOL/L — LOW (ref 22–29)
CREAT SERPL-MCNC: 0.35 MG/DL — LOW (ref 0.5–1.3)
CREAT SERPL-MCNC: 0.35 MG/DL — LOW (ref 0.5–1.3)
CREAT SERPL-MCNC: 0.36 MG/DL — LOW (ref 0.5–1.3)
CULTURE RESULTS: NO GROWTH — SIGNIFICANT CHANGE UP
EGFR: 106 ML/MIN/1.73M2 — SIGNIFICANT CHANGE UP
EGFR: 107 ML/MIN/1.73M2 — SIGNIFICANT CHANGE UP
EGFR: 107 ML/MIN/1.73M2 — SIGNIFICANT CHANGE UP
GLUCOSE SERPL-MCNC: 106 MG/DL — HIGH (ref 70–99)
GLUCOSE SERPL-MCNC: 115 MG/DL — HIGH (ref 70–99)
GLUCOSE SERPL-MCNC: 119 MG/DL — HIGH (ref 70–99)
HCT VFR BLD CALC: 34.4 % — LOW (ref 39–50)
HGB BLD-MCNC: 11.8 G/DL — LOW (ref 13–17)
MAGNESIUM SERPL-MCNC: 2.1 MG/DL — SIGNIFICANT CHANGE UP (ref 1.6–2.6)
MCHC RBC-ENTMCNC: 34.3 GM/DL — SIGNIFICANT CHANGE UP (ref 32–36)
MCHC RBC-ENTMCNC: 34.9 PG — HIGH (ref 27–34)
MCV RBC AUTO: 101.8 FL — HIGH (ref 80–100)
PLATELET # BLD AUTO: 339 K/UL — SIGNIFICANT CHANGE UP (ref 150–400)
POTASSIUM SERPL-MCNC: 3.1 MMOL/L — LOW (ref 3.5–5.3)
POTASSIUM SERPL-MCNC: 3.2 MMOL/L — LOW (ref 3.5–5.3)
POTASSIUM SERPL-MCNC: 4.6 MMOL/L — SIGNIFICANT CHANGE UP (ref 3.5–5.3)
POTASSIUM SERPL-SCNC: 3.1 MMOL/L — LOW (ref 3.5–5.3)
POTASSIUM SERPL-SCNC: 3.2 MMOL/L — LOW (ref 3.5–5.3)
POTASSIUM SERPL-SCNC: 4.6 MMOL/L — SIGNIFICANT CHANGE UP (ref 3.5–5.3)
RBC # BLD: 3.38 M/UL — LOW (ref 4.2–5.8)
RBC # FLD: 12.8 % — SIGNIFICANT CHANGE UP (ref 10.3–14.5)
SODIUM SERPL-SCNC: 147 MMOL/L — HIGH (ref 135–145)
SODIUM SERPL-SCNC: 147 MMOL/L — HIGH (ref 135–145)
SODIUM SERPL-SCNC: 150 MMOL/L — HIGH (ref 135–145)
SPECIMEN SOURCE: SIGNIFICANT CHANGE UP
WBC # BLD: 9.06 K/UL — SIGNIFICANT CHANGE UP (ref 3.8–10.5)
WBC # FLD AUTO: 9.06 K/UL — SIGNIFICANT CHANGE UP (ref 3.8–10.5)

## 2024-01-24 PROCEDURE — 99232 SBSQ HOSP IP/OBS MODERATE 35: CPT

## 2024-01-24 PROCEDURE — 99233 SBSQ HOSP IP/OBS HIGH 50: CPT

## 2024-01-24 RX ORDER — SODIUM CHLORIDE 9 MG/ML
1000 INJECTION, SOLUTION INTRAVENOUS
Refills: 0 | Status: DISCONTINUED | OUTPATIENT
Start: 2024-01-24 | End: 2024-01-25

## 2024-01-24 RX ORDER — POTASSIUM CHLORIDE 20 MEQ
40 PACKET (EA) ORAL
Refills: 0 | Status: COMPLETED | OUTPATIENT
Start: 2024-01-24 | End: 2024-01-24

## 2024-01-24 RX ORDER — SODIUM CHLORIDE 9 MG/ML
1000 INJECTION, SOLUTION INTRAVENOUS
Refills: 0 | Status: DISCONTINUED | OUTPATIENT
Start: 2024-01-24 | End: 2024-01-24

## 2024-01-24 RX ORDER — POTASSIUM CHLORIDE 20 MEQ
10 PACKET (EA) ORAL
Refills: 0 | Status: COMPLETED | OUTPATIENT
Start: 2024-01-24 | End: 2024-01-24

## 2024-01-24 RX ADMIN — Medication 100 MILLIEQUIVALENT(S): at 18:10

## 2024-01-24 RX ADMIN — ENOXAPARIN SODIUM 40 MILLIGRAM(S): 100 INJECTION SUBCUTANEOUS at 11:54

## 2024-01-24 RX ADMIN — Medication 100 MILLIEQUIVALENT(S): at 16:34

## 2024-01-24 RX ADMIN — SODIUM CHLORIDE 100 MILLILITER(S): 9 INJECTION, SOLUTION INTRAVENOUS at 16:33

## 2024-01-24 RX ADMIN — Medication 300 MILLIGRAM(S): at 18:12

## 2024-01-24 RX ADMIN — Medication 40 MILLIEQUIVALENT(S): at 20:32

## 2024-01-24 RX ADMIN — Medication 40 MILLIEQUIVALENT(S): at 17:42

## 2024-01-24 NOTE — PROGRESS NOTE ADULT - ASSESSMENT
89 yo male with pmhx of HTN, CVA with residual right sided hemiplegia and dysphagia, Prostate CA, chronic UTI presenting to the ED with daughter for decrease PO intake and slurred speech.    Slurred Speech, Acute metabolic encephalopathy  - likely 2/2 dehydration  -Check MRI to r/o stroke  -TTE reviewed- EF 68%  -Rectal aspirin, statin  -Neurochecks Q4  -Patient recently started on Remeron, will hold for now in case it is contributing  -Puree w/ Mod Thick Liqs as per SLP  -PT/OT eval  -f/u neuro recs - not likely stroke, no objection to MRI to R/o stroke    Hypovolemic Hypernatremia  Dehydration  - leukocytosis resolved, no further tachycardia  - likely dehydration, less likely SIRS/Sepsis  -UA neg  -CXR without infiltrate, no upper respiratory symptoms to report  -Follow up blood and urine cultures     HTN  -Continue Lisinopril 10 mg daily with hold parameters    Prostate Ca   -On Enzalumatide 140 mg daily  -Non-formulary, patient may bring home med    Overactive Bladder  -Continue Myrbetric ER 50 mg daily once patient can tolerate PO    DVTppx: Lovenox  GOC: DNR/DNI, MOLST completed in chart
89y Male with prior stroke and residual right hemiparesis.   Now with failure to thrive and increased slurred speech.    Slurred speech.   Likely secondary to dehydration.   Recommend gentle hydration.  Presentation does not suggest new stroke as this has been a subacute progression.  No objection to MRI to rule this out, however.  Mobilize with physical therapy.   Puree w/ Mod Thick Liqs as per SLP      
89 yo male with pmhx of HTN, CVA with residual right sided hemiplegia and dysphagia, Prostate CA, chronic UTI presenting to the ED with daughter for decrease PO intake and slurred speech.    Acute metabolic encephalopathy  - likely 2/2 dehydration and poor oral intake  -Check MRI to r/o stroke  -TTE reviewed- EF 68%  -Rectal aspirin, statin  -DC Neurochecks  -restart mirtazapine  -Puree w/ Mod Thick Liqs as per SLP  -PT/OT eval reviewed - at functional baseline  -f/u neuro recs - not likely stroke, no objection to MRI to R/o stroke    Hypovolemic Hypernatremia  Dehydration  - leukocytosis resolved, no further tachycardia  - likely dehydration, less likely SIRS/Sepsis  -UA neg  -CXR without infiltrate, no upper respiratory symptoms to report  -Follow up blood and urine cultures     HTN  -Continue Lisinopril 10 mg daily with hold parameters    Prostate Ca   -On Enzalumatide 140 mg daily  -Non-formulary, patient may bring home med    Overactive Bladder  -Continue Myrbetric ER 50 mg daily once patient can tolerate PO    DVTppx: Lovenox  GOC: DNR/DNI, MOLST completed in chart

## 2024-01-24 NOTE — PROGRESS NOTE ADULT - SUBJECTIVE AND OBJECTIVE BOX
[facility]                                        Neurology at [place]                                 Trevor Alarcon & David                                  [address]                                        [place], [place], [address]                                             [phone]        CC: slurred speech    HPI:   The patient is a 89y Male with prior history of stroke with residual right hemiparesis, and dysphagia now presents with decreased PO intake for the past few weeks.   He developed slurring of the speech and ultimately was brought to the ER.   He has been treated for urinary tract infections over the past few weeks as well.     Interim history:  Now on 6 Tower.     ROS:   Denies headache or dizziness.  Denies chest pain.  Denies shortness of breath.    MEDICATIONS  (STANDING):  aspirin Suppository 300 milliGRAM(s) Rectal daily  dextrose 5% 1000 milliLiter(s) (75 mL/Hr) IV Continuous <Continuous>  enoxaparin Injectable 40 milliGRAM(s) SubCutaneous every 24 hours  enzalutamide 160 milliGRAM(s) Oral daily  lisinopril 10 milliGRAM(s) Oral daily  mirabegron ER 50 milliGRAM(s) Oral daily  mirtazapine 7.5 milliGRAM(s) Oral daily    Vital Signs Last 24 Hrs  T(C): 36.4 (24 Jan 2024 04:47), Max: 37 (23 Jan 2024 19:28)  T(F): 97.6 (24 Jan 2024 04:47), Max: 98.6 (23 Jan 2024 19:28)  HR: 75 (24 Jan 2024 04:47) (75 - 183)  BP: 120/72 (24 Jan 2024 04:47) (120/72 - 143/81)  RR: 18 (24 Jan 2024 04:47) (18 - 18)  SpO2: 95% (24 Jan 2024 04:47) (93% - 99%)    Parameters below as of 24 Jan 2024 04:47  Patient On (Oxygen Delivery Method): room air    Detailed Neurologic Exam:    Mental status: The patient is awake and alert. There is marked dysarthria. He answers questions and follows simple instructions with left side.     Cranial nerves: Pupils react symmetrically to light. There is no visual field deficit to confrontation. Extraocular motion is full with no nystagmus.  Facial sensation is intact. Facial musculature is symmetric. Palate elevates symmetrically. Tongue is midline.    Motor: There is increased tone in the right arm.  Strength is 0/5 in the right arm and leg.   Strength is 5/5 in the left arm and 4/5 in the left leg.    Sensation: Intact to light touch and pin.     Reflexes: 2+ throughout and plantar responses are flexor on the left and extensor on the right.    Cerebellar: There is no dysmetria on finger to nose testing on the left.    Labs:     01-24    147<H>  |  115<H>  |  26.1<H>  ----------------------------<  119<H>  3.1<L>   |  21.0<L>  |  0.35<L>    Ca    9.3      24 Jan 2024 07:27  Mg     2.1     01-24    TPro  7.7  /  Alb  3.9  /  TBili  0.8  /  DBili  x   /  AST  14  /  ALT  11  /  AlkPhos  76  01-22                            11.8   9.06  )-----------( 339      ( 24 Jan 2024 07:27 )             34.4           
Hospitalist Progress Note    Chief Complaint:  Slurred Speech    SUBJECTIVE / OVERNIGHT EVENTS:  Patient seen at bedside, in NAD, alert yet confused, not oriented. Unable to obtain ROS due to mental status.    MEDICATIONS  (STANDING):  aspirin Suppository 300 milliGRAM(s) Rectal daily  dextrose 5% 1000 milliLiter(s) (75 mL/Hr) IV Continuous <Continuous>  enoxaparin Injectable 40 milliGRAM(s) SubCutaneous every 24 hours  enzalutamide 160 milliGRAM(s) Oral daily  lisinopril 10 milliGRAM(s) Oral daily  mirabegron ER 50 milliGRAM(s) Oral daily  mirtazapine 7.5 milliGRAM(s) Oral daily    MEDICATIONS  (PRN):  acetaminophen     Tablet .. 650 milliGRAM(s) Oral every 6 hours PRN Temp greater or equal to 38C (100.4F), Mild Pain (1 - 3)  aluminum hydroxide/magnesium hydroxide/simethicone Suspension 30 milliLiter(s) Oral every 4 hours PRN Dyspepsia  melatonin 3 milliGRAM(s) Oral at bedtime PRN Insomnia  ondansetron Injectable 4 milliGRAM(s) IV Push every 8 hours PRN Nausea and/or Vomiting        I&O's Summary      PHYSICAL EXAM:  Vital Signs Last 24 Hrs  T(C): 36.4 (24 Jan 2024 04:47), Max: 37 (23 Jan 2024 19:28)  T(F): 97.6 (24 Jan 2024 04:47), Max: 98.6 (23 Jan 2024 19:28)  HR: 75 (24 Jan 2024 04:47) (71 - 183)  BP: 120/72 (24 Jan 2024 04:47) (120/72 - 143/81)  BP(mean): --  RR: 18 (24 Jan 2024 04:47) (18 - 18)  SpO2: 95% (24 Jan 2024 04:47) (93% - 99%)    Parameters below as of 24 Jan 2024 04:47  Patient On (Oxygen Delivery Method): room air          CONSTITUTIONAL: NAD, elderly  HEENMT: NC/AT, PERRL  RESPIRATORY: BLAE, No adventitious Lungs Sounds  CARDIOVASCULAR: S1/S2+, RRR, no MRG  ABDOMEN: Soft, NT/ND, BS+, No guarding  MSK:  Moves limbs spontaneously, frail skin  PSYCH: confused, calm  NEUROLOGY: Alert, yet confused, unable to assess fully    LABS:                        11.8   9.06  )-----------( 339      ( 24 Jan 2024 07:27 )             34.4     01-24    147<H>  |  115<H>  |  26.1<H>  ----------------------------<  119<H>  3.1<L>   |  21.0<L>  |  0.35<L>    Ca    9.3      24 Jan 2024 07:27    TPro  7.7  /  Alb  3.9  /  TBili  0.8  /  DBili  x   /  AST  14  /  ALT  11  /  AlkPhos  76  01-22    PT/INR - ( 22 Jan 2024 19:35 )   PT: 13.0 sec;   INR: 1.18 ratio         PTT - ( 22 Jan 2024 19:35 )  PTT:32.8 sec      Urinalysis Basic - ( 24 Jan 2024 07:27 )    Color: x / Appearance: x / SG: x / pH: x  Gluc: 119 mg/dL / Ketone: x  / Bili: x / Urobili: x   Blood: x / Protein: x / Nitrite: x   Leuk Esterase: x / RBC: x / WBC x   Sq Epi: x / Non Sq Epi: x / Bacteria: x        Culture - Urine (collected 22 Jan 2024 21:20)  Source: Clean Catch Clean Catch (Midstream)  Final Report (24 Jan 2024 07:11):    No growth    Culture - Blood (collected 22 Jan 2024 19:45)  Source: .Blood Blood-Peripheral  Preliminary Report (24 Jan 2024 02:03):    No growth at 24 hours    Culture - Blood (collected 22 Jan 2024 19:35)  Source: .Blood Blood-Peripheral  Preliminary Report (24 Jan 2024 02:03):    No growth at 24 hours      CAPILLARY BLOOD GLUCOSE            RADIOLOGY & ADDITIONAL TESTS:  Results Reviewed: Y  Imaging Personally Reviewed: N  Electrocardiogram Personally Reviewed: JASMINE                                          
Hospitalist Progress Note    Chief Complaint:  Slurred Speech    SUBJECTIVE / OVERNIGHT EVENTS:  Patient seen at bedside, in NAD, alert yet confused, not oriented. Unable to obtain ROS due to mental status.    MEDICATIONS  (STANDING):  aspirin Suppository 300 milliGRAM(s) Rectal daily  dextrose 5% + lactated ringers. 1000 milliLiter(s) (60 mL/Hr) IV Continuous <Continuous>  enoxaparin Injectable 40 milliGRAM(s) SubCutaneous every 24 hours  enzalutamide 160 milliGRAM(s) Oral daily  lisinopril 10 milliGRAM(s) Oral daily  mirabegron ER 50 milliGRAM(s) Oral daily  pantoprazole  Injectable 40 milliGRAM(s) IV Push once  potassium chloride  10 mEq/100 mL IVPB 10 milliEquivalent(s) IV Intermittent every 1 hour    MEDICATIONS  (PRN):  acetaminophen     Tablet .. 650 milliGRAM(s) Oral every 6 hours PRN Temp greater or equal to 38C (100.4F), Mild Pain (1 - 3)  aluminum hydroxide/magnesium hydroxide/simethicone Suspension 30 milliLiter(s) Oral every 4 hours PRN Dyspepsia  melatonin 3 milliGRAM(s) Oral at bedtime PRN Insomnia  ondansetron Injectable 4 milliGRAM(s) IV Push every 8 hours PRN Nausea and/or Vomiting        I&O's Summary      PHYSICAL EXAM:  Vital Signs Last 24 Hrs  T(C): 36.7 (23 Jan 2024 12:10), Max: 37.3 (22 Jan 2024 21:30)  T(F): 98.1 (23 Jan 2024 12:10), Max: 99.1 (22 Jan 2024 21:30)  HR: 71 (23 Jan 2024 09:27) (64 - 110)  BP: 141/97 (23 Jan 2024 12:10) (106/67 - 141/97)  BP(mean): --  RR: 18 (23 Jan 2024 12:10) (16 - 20)  SpO2: 95% (23 Jan 2024 12:10) (95% - 99%)    Parameters below as of 23 Jan 2024 04:43  Patient On (Oxygen Delivery Method): room air            CONSTITUTIONAL: NAD, elderly  HEENMT: NC/AT, PERRL  RESPIRATORY: BLAE, No adventitious Lungs Sounds  CARDIOVASCULAR: S1/S2+, RRR, no MRG  ABDOMEN: Soft, NT/ND, BS+, No guarding  MSK:  Moves limbs spontaneously, frail skin  PSYCH: confused, calm  NEUROLOGY: Alert, yet confused, unable to assess fully    LABS:                        12.2   7.07  )-----------( 316      ( 23 Jan 2024 08:17 )             37.8     01-23    146<H>  |  114<H>  |  36.0<H>  ----------------------------<  109<H>  3.3<L>   |  19.0<L>  |  0.41<L>    Ca    9.5      23 Jan 2024 08:17    TPro  7.7  /  Alb  3.9  /  TBili  0.8  /  DBili  x   /  AST  14  /  ALT  11  /  AlkPhos  76  01-22    PT/INR - ( 22 Jan 2024 19:35 )   PT: 13.0 sec;   INR: 1.18 ratio         PTT - ( 22 Jan 2024 19:35 )  PTT:32.8 sec      Urinalysis Basic - ( 23 Jan 2024 08:17 )    Color: x / Appearance: x / SG: x / pH: x  Gluc: 109 mg/dL / Ketone: x  / Bili: x / Urobili: x   Blood: x / Protein: x / Nitrite: x   Leuk Esterase: x / RBC: x / WBC x   Sq Epi: x / Non Sq Epi: x / Bacteria: x        CAPILLARY BLOOD GLUCOSE      POCT Blood Glucose.: 139 mg/dL (22 Jan 2024 20:37)  POCT Blood Glucose.: 133 mg/dL (22 Jan 2024 19:50)        RADIOLOGY & ADDITIONAL TESTS:  Results Reviewed: Y  Imaging Personally Reviewed: N  Electrocardiogram Personally Reviewed: N

## 2024-01-25 ENCOUNTER — TRANSCRIPTION ENCOUNTER (OUTPATIENT)
Age: 89
End: 2024-01-25

## 2024-01-25 LAB
ANION GAP SERPL CALC-SCNC: 11 MMOL/L — SIGNIFICANT CHANGE UP (ref 5–17)
BUN SERPL-MCNC: 21.5 MG/DL — HIGH (ref 8–20)
CALCIUM SERPL-MCNC: 9.2 MG/DL — SIGNIFICANT CHANGE UP (ref 8.4–10.5)
CHLORIDE SERPL-SCNC: 111 MMOL/L — HIGH (ref 96–108)
CO2 SERPL-SCNC: 21 MMOL/L — LOW (ref 22–29)
CREAT SERPL-MCNC: 0.34 MG/DL — LOW (ref 0.5–1.3)
EGFR: 108 ML/MIN/1.73M2 — SIGNIFICANT CHANGE UP
GLUCOSE SERPL-MCNC: 119 MG/DL — HIGH (ref 70–99)
HCT VFR BLD CALC: 37.2 % — LOW (ref 39–50)
HGB BLD-MCNC: 12.6 G/DL — LOW (ref 13–17)
MAGNESIUM SERPL-MCNC: 2 MG/DL — SIGNIFICANT CHANGE UP (ref 1.6–2.6)
MCHC RBC-ENTMCNC: 33.9 GM/DL — SIGNIFICANT CHANGE UP (ref 32–36)
MCHC RBC-ENTMCNC: 34.1 PG — HIGH (ref 27–34)
MCV RBC AUTO: 100.8 FL — HIGH (ref 80–100)
PLATELET # BLD AUTO: 279 K/UL — SIGNIFICANT CHANGE UP (ref 150–400)
POTASSIUM SERPL-MCNC: 3.9 MMOL/L — SIGNIFICANT CHANGE UP (ref 3.5–5.3)
POTASSIUM SERPL-SCNC: 3.9 MMOL/L — SIGNIFICANT CHANGE UP (ref 3.5–5.3)
RBC # BLD: 3.69 M/UL — LOW (ref 4.2–5.8)
RBC # FLD: 12.8 % — SIGNIFICANT CHANGE UP (ref 10.3–14.5)
SODIUM SERPL-SCNC: 143 MMOL/L — SIGNIFICANT CHANGE UP (ref 135–145)
WBC # BLD: 10.92 K/UL — HIGH (ref 3.8–10.5)
WBC # FLD AUTO: 10.92 K/UL — HIGH (ref 3.8–10.5)

## 2024-01-25 PROCEDURE — 93306 TTE W/DOPPLER COMPLETE: CPT

## 2024-01-25 PROCEDURE — 82435 ASSAY OF BLOOD CHLORIDE: CPT

## 2024-01-25 PROCEDURE — 84132 ASSAY OF SERUM POTASSIUM: CPT

## 2024-01-25 PROCEDURE — 87086 URINE CULTURE/COLONY COUNT: CPT

## 2024-01-25 PROCEDURE — 80053 COMPREHEN METABOLIC PANEL: CPT

## 2024-01-25 PROCEDURE — 85730 THROMBOPLASTIN TIME PARTIAL: CPT

## 2024-01-25 PROCEDURE — 87040 BLOOD CULTURE FOR BACTERIA: CPT

## 2024-01-25 PROCEDURE — 85014 HEMATOCRIT: CPT

## 2024-01-25 PROCEDURE — 85025 COMPLETE CBC W/AUTO DIFF WBC: CPT

## 2024-01-25 PROCEDURE — 97167 OT EVAL HIGH COMPLEX 60 MIN: CPT

## 2024-01-25 PROCEDURE — 84295 ASSAY OF SERUM SODIUM: CPT

## 2024-01-25 PROCEDURE — 93005 ELECTROCARDIOGRAM TRACING: CPT

## 2024-01-25 PROCEDURE — 85018 HEMOGLOBIN: CPT

## 2024-01-25 PROCEDURE — 83605 ASSAY OF LACTIC ACID: CPT

## 2024-01-25 PROCEDURE — 84484 ASSAY OF TROPONIN QUANT: CPT

## 2024-01-25 PROCEDURE — 84443 ASSAY THYROID STIM HORMONE: CPT

## 2024-01-25 PROCEDURE — 80048 BASIC METABOLIC PNL TOTAL CA: CPT

## 2024-01-25 PROCEDURE — 0225U NFCT DS DNA&RNA 21 SARSCOV2: CPT

## 2024-01-25 PROCEDURE — 82330 ASSAY OF CALCIUM: CPT

## 2024-01-25 PROCEDURE — 70450 CT HEAD/BRAIN W/O DYE: CPT | Mod: MA

## 2024-01-25 PROCEDURE — 92526 ORAL FUNCTION THERAPY: CPT

## 2024-01-25 PROCEDURE — 92610 EVALUATE SWALLOWING FUNCTION: CPT

## 2024-01-25 PROCEDURE — 83036 HEMOGLOBIN GLYCOSYLATED A1C: CPT

## 2024-01-25 PROCEDURE — 71045 X-RAY EXAM CHEST 1 VIEW: CPT

## 2024-01-25 PROCEDURE — 82947 ASSAY GLUCOSE BLOOD QUANT: CPT

## 2024-01-25 PROCEDURE — 99239 HOSP IP/OBS DSCHRG MGMT >30: CPT

## 2024-01-25 PROCEDURE — 85610 PROTHROMBIN TIME: CPT

## 2024-01-25 PROCEDURE — 80061 LIPID PANEL: CPT

## 2024-01-25 PROCEDURE — 82607 VITAMIN B-12: CPT

## 2024-01-25 PROCEDURE — 81001 URINALYSIS AUTO W/SCOPE: CPT

## 2024-01-25 PROCEDURE — 82962 GLUCOSE BLOOD TEST: CPT

## 2024-01-25 PROCEDURE — 96374 THER/PROPH/DIAG INJ IV PUSH: CPT

## 2024-01-25 PROCEDURE — 99285 EMERGENCY DEPT VISIT HI MDM: CPT

## 2024-01-25 PROCEDURE — 85027 COMPLETE CBC AUTOMATED: CPT

## 2024-01-25 PROCEDURE — 83735 ASSAY OF MAGNESIUM: CPT

## 2024-01-25 PROCEDURE — 36415 COLL VENOUS BLD VENIPUNCTURE: CPT

## 2024-01-25 PROCEDURE — 82140 ASSAY OF AMMONIA: CPT

## 2024-01-25 PROCEDURE — 82803 BLOOD GASES ANY COMBINATION: CPT

## 2024-01-25 RX ADMIN — Medication 300 MILLIGRAM(S): at 11:20

## 2024-01-25 RX ADMIN — LISINOPRIL 10 MILLIGRAM(S): 2.5 TABLET ORAL at 05:49

## 2024-01-25 RX ADMIN — ENOXAPARIN SODIUM 40 MILLIGRAM(S): 100 INJECTION SUBCUTANEOUS at 11:08

## 2024-01-25 RX ADMIN — MIRTAZAPINE 7.5 MILLIGRAM(S): 45 TABLET, ORALLY DISINTEGRATING ORAL at 11:08

## 2024-01-25 RX ADMIN — MIRABEGRON 50 MILLIGRAM(S): 50 TABLET, EXTENDED RELEASE ORAL at 11:08

## 2024-01-25 NOTE — DISCHARGE NOTE NURSING/CASE MANAGEMENT/SOCIAL WORK - PATIENT PORTAL LINK FT
You can access the FollowMyHealth Patient Portal offered by [facility] by registering at the following website: [email or web address]. By joining [facility]’s FollowMyHealth portal, you will also be able to view your health information using other applications (apps) compatible with our system.

## 2024-01-25 NOTE — DIETITIAN INITIAL EVALUATION ADULT - NS FNS DIET ORDER
Diet, Pureed:   DASH/TLC {Sodium & Cholesterol Restricted} (DASH)  Moderately Thick Liquids (MODTHICKLIQS) (01-24-24 @ 08:03)

## 2024-01-25 NOTE — DIETITIAN INITIAL EVALUATION ADULT - ORAL INTAKE PTA/DIET HISTORY
Pt sleeping soundly, A&Ox2 noted. Pt s/p SLP eval (1/23) recommending pure/mod thick fluids, tolerating though <50% completion of meals noted. Unclear accuracy of admission weight vs RD bed scale weight 110lbs, will continue to monitor.

## 2024-01-25 NOTE — DIETITIAN INITIAL EVALUATION ADULT - ENTER FROM (CAL/KG)
Problem: Discharge Planning  Goal: Discharge Planning (Adult, OB, Behavioral, Peds)  Outcome: Improving  PRIMARY DIAGNOSIS: GASTROENTERITIS  Primary Symptoms: nausea, vomiting and diarrhea      OUTPATIENT/OBSERVATION GOALS TO BE MET BEFORE DISCHARGE:      1. Orthostatic symptoms (BP decrease or HR increase with patient upright)? N/A  2. Documented urine output: Yes  3. Tolerating PO fluid: yes, clear liquids  4. Nausea/Vomiting/Diarrhea (if present) symptoms improved: last Diarrhea episode was in the around 8:30pm       Pt is A&Ox4, VSS. Pt denies abd pain at this time.  Last BM was in the evening shift. Pt is positive for norovirus. Pt denies nausea.Tolerating clear liquids. Up ind in room. IVF infusing. Will continue to monitor, assess, and offer supportive cares.               25

## 2024-01-25 NOTE — DIETITIAN INITIAL EVALUATION ADULT - PERTINENT MEDS FT
MEDICATIONS  (STANDING):  aspirin Suppository 300 milliGRAM(s) Rectal daily  enoxaparin Injectable 40 milliGRAM(s) SubCutaneous every 24 hours  enzalutamide 160 milliGRAM(s) Oral daily  lisinopril 10 milliGRAM(s) Oral daily  mirabegron ER 50 milliGRAM(s) Oral daily  mirtazapine 7.5 milliGRAM(s) Oral daily    MEDICATIONS  (PRN):  acetaminophen     Tablet .. 650 milliGRAM(s) Oral every 6 hours PRN Temp greater or equal to 38C (100.4F), Mild Pain (1 - 3)  aluminum hydroxide/magnesium hydroxide/simethicone Suspension 30 milliLiter(s) Oral every 4 hours PRN Dyspepsia  melatonin 3 milliGRAM(s) Oral at bedtime PRN Insomnia  ondansetron Injectable 4 milliGRAM(s) IV Push every 8 hours PRN Nausea and/or Vomiting

## 2024-01-25 NOTE — DISCHARGE NOTE PROVIDER - HOSPITAL COURSE
89 y/o M here for slurred speech, CT scan initially r/o stroke, neurology does not suspect stroke at this time, more likely failure to thrive and dehydration. Patient hydrated and started on PO feeds as per SLP. Clinically improved, has full support at home, deemed fit for DC to home w/ home support.

## 2024-01-25 NOTE — DIETITIAN INITIAL EVALUATION ADULT - PERTINENT LABORATORY DATA
01-25    143  |  111<H>  |  21.5<H>  ----------------------------<  119<H>  3.9   |  21.0<L>  |  0.34<L>    Ca    9.2      25 Jan 2024 05:45  Mg     2.0     01-25    A1C with Estimated Average Glucose Result: 5.0 % (01-23-24 @ 08:09)

## 2024-01-25 NOTE — DIETITIAN INITIAL EVALUATION ADULT - ETIOLOGY
related to inability to meet sufficient protein-energy needs in setting of CVA, dysphagia, recent UTI, lack of appetite, increased confusion

## 2024-01-25 NOTE — DIETITIAN INITIAL EVALUATION ADULT - OTHER INFO
89 yo male with pmhx of HTN, CVA with residual right sided hemiplegia and dysphagia, Prostate CA, chronic UTI presenting to the ED with daughter for decrease PO intake and slurred speech. Called daughter Jing Vazquez [phone] (HCP) to obtain collateral info as patient currently cannot give history. Daughter states patient had a hemorrhagic stroke 1.5 years ago when residing in [place]. From this stroke, he has residual right sided weakness and dysphagia. He still can tolerate soft foods. Since then, he has moved to [place] to live with her and up until recently has been doing well. About 1.5 weeks ago, she noted him to have decreased appetite and poor PO intake. She then noted him to have slurred speech and confusion over the past day which has progressed. Daughter states patient is normally AAOx4, clear and very sharp. Patient did recently complete 2 courses of antibiotics outpatient for UTI (Cipro and Macrobid). Abdominal pain reported.

## 2024-01-25 NOTE — DISCHARGE NOTE PROVIDER - NSDCMRMEDTOKEN_GEN_ALL_CORE_FT
enzalutamide 40 mg oral capsule: 4 cap(s) orally once a day  lisinopril 10 mg oral tablet: 1 tab(s) orally once a day  mirabegron 50 mg oral tablet, extended release: 1 tab(s) orally once a day  mirtazapine 7.5 mg oral tablet: 1 tab(s) orally once a day  omeprazole 40 mg oral delayed release capsule: 1 cap(s) orally once a day

## 2024-01-25 NOTE — DIETITIAN NUTRITION RISK NOTIFICATION - ADDITIONAL COMMENTS/DIETITIAN RECOMMENDATIONS
1) Liberalize DASH/TLC restriction 2/2 altered consistency  2) Add Ensure BID, consistency per SLP  3) Rx MVI daily  4) Encourage HBV protein sources   5) Monitor weights daily for trend/accuracy

## 2024-01-25 NOTE — DISCHARGE NOTE NURSING/CASE MANAGEMENT/SOCIAL WORK - NSDCPEFALRISK_GEN_ALL_CORE
For information on Fall & Injury Prevention, visit: [email or web address] OR  [email or web address] OR  https://www.cdc.gov/steadi/patient.html

## 2024-01-25 NOTE — DIETITIAN INITIAL EVALUATION ADULT - ADD RECOMMEND
Liberalize DASH/TLC restriction 2/2 altered consistency; Add Ensure BID, consistency per SLP; Rx MVI daily; Encourage HBV protein sources

## 2024-01-25 NOTE — DISCHARGE NOTE PROVIDER - ATTENDING DISCHARGE PHYSICAL EXAMINATION:
CONSTITUTIONAL: NAD, elderly  HEENMT: NC/AT, PERRL  RESPIRATORY: BLAE, No adventitious Lungs Sounds  CARDIOVASCULAR: S1/S2+, RRR, no MRG  ABDOMEN: Soft, NT/ND, BS+, No guarding  MSK:  Moves limbs spontaneously, frail skin  PSYCH: Confused, calm  NEUROLOGY: Alert, yet confused, unable to assess fully

## 2024-01-25 NOTE — DIETITIAN NUTRITION RISK NOTIFICATION - TREATMENT: THE FOLLOWING DIET HAS BEEN RECOMMENDED
Diet, Pureed:   DASH/TLC {Sodium & Cholesterol Restricted} (DASH)  Moderately Thick Liquids (MODTHICKLIQS) (01-24-24 @ 08:03) [Active]

## 2024-01-25 NOTE — DISCHARGE NOTE PROVIDER - NSDCCPCAREPLAN_GEN_ALL_CORE_FT
PRINCIPAL DISCHARGE DIAGNOSIS  Diagnosis: Slurred speech  Assessment and Plan of Treatment: Slurred speech, stroke ruled out. more likely acute metabolic encephalopathy 2/2 dehydration and failure to thrive. Insure adequate hydration and PO intake. please follow up with outpatient physicians within 1 week.      SECONDARY DISCHARGE DIAGNOSES  Diagnosis: Adult failure to thrive  Assessment and Plan of Treatment: Plan as above. Take multivitamin and Folic acid daily over the counter

## 2024-01-26 VITALS
RESPIRATION RATE: 18 BRPM | TEMPERATURE: 98 F | DIASTOLIC BLOOD PRESSURE: 85 MMHG | HEART RATE: 89 BPM | OXYGEN SATURATION: 95 % | SYSTOLIC BLOOD PRESSURE: 159 MMHG

## 2024-01-28 LAB
CULTURE RESULTS: SIGNIFICANT CHANGE UP
CULTURE RESULTS: SIGNIFICANT CHANGE UP
SPECIMEN SOURCE: SIGNIFICANT CHANGE UP
SPECIMEN SOURCE: SIGNIFICANT CHANGE UP

## 2024-03-21 RX ORDER — ENZALUTAMIDE 80 MG/1
4 TABLET ORAL
Refills: 0 | DISCHARGE

## 2024-03-21 RX ORDER — LISINOPRIL 2.5 MG/1
1 TABLET ORAL
Refills: 0 | DISCHARGE

## 2024-03-21 RX ORDER — MIRABEGRON 50 MG/1
1 TABLET, EXTENDED RELEASE ORAL
Refills: 0 | DISCHARGE

## 2024-03-21 RX ORDER — OMEPRAZOLE 10 MG/1
1 CAPSULE, DELAYED RELEASE ORAL
Refills: 0 | DISCHARGE

## 2024-03-21 RX ORDER — MIRTAZAPINE 45 MG/1
1 TABLET, ORALLY DISINTEGRATING ORAL
Refills: 0 | DISCHARGE

## 2024-10-17 NOTE — SWALLOW BEDSIDE ASSESSMENT ADULT - DATE
Orders:    fluticasone (FLOVENT HFA) 220 MCG/ACT inhaler; Inhale 2 puffs into the lungs 2 times daily     23-Jan-2024